# Patient Record
Sex: FEMALE | Race: WHITE | NOT HISPANIC OR LATINO | Employment: OTHER | URBAN - NONMETROPOLITAN AREA
[De-identification: names, ages, dates, MRNs, and addresses within clinical notes are randomized per-mention and may not be internally consistent; named-entity substitution may affect disease eponyms.]

---

## 2024-08-26 ENCOUNTER — OFFICE VISIT (OUTPATIENT)
Dept: FAMILY MEDICINE | Facility: OTHER | Age: 69
End: 2024-08-26
Payer: COMMERCIAL

## 2024-08-26 VITALS
HEIGHT: 64 IN | HEART RATE: 80 BPM | TEMPERATURE: 99.3 F | RESPIRATION RATE: 16 BRPM | OXYGEN SATURATION: 95 % | SYSTOLIC BLOOD PRESSURE: 136 MMHG | WEIGHT: 259.7 LBS | BODY MASS INDEX: 44.34 KG/M2 | DIASTOLIC BLOOD PRESSURE: 86 MMHG

## 2024-08-26 DIAGNOSIS — B37.2 CANDIDAL INTERTRIGO: ICD-10-CM

## 2024-08-26 DIAGNOSIS — L03.311 CELLULITIS OF ABDOMINAL WALL: Primary | ICD-10-CM

## 2024-08-26 PROCEDURE — G0463 HOSPITAL OUTPT CLINIC VISIT: HCPCS

## 2024-08-26 PROCEDURE — 250N000009 HC RX 250: Performed by: PHYSICIAN ASSISTANT

## 2024-08-26 PROCEDURE — 250N000011 HC RX IP 250 OP 636: Mod: JZ | Performed by: PHYSICIAN ASSISTANT

## 2024-08-26 PROCEDURE — G0463 HOSPITAL OUTPT CLINIC VISIT: HCPCS | Mod: 25

## 2024-08-26 PROCEDURE — 96372 THER/PROPH/DIAG INJ SC/IM: CPT | Performed by: PHYSICIAN ASSISTANT

## 2024-08-26 PROCEDURE — 99203 OFFICE O/P NEW LOW 30 MIN: CPT | Performed by: PHYSICIAN ASSISTANT

## 2024-08-26 RX ORDER — CEPHALEXIN 500 MG/1
500 CAPSULE ORAL 3 TIMES DAILY
Qty: 30 CAPSULE | Refills: 0 | Status: SHIPPED | OUTPATIENT
Start: 2024-08-26 | End: 2024-09-05

## 2024-08-26 RX ORDER — CEFTRIAXONE SODIUM 1 G
1 VIAL (EA) INJECTION ONCE
Status: COMPLETED | OUTPATIENT
Start: 2024-08-26 | End: 2024-08-26

## 2024-08-26 RX ORDER — LIDOCAINE HYDROCHLORIDE 10 MG/ML
2 INJECTION, SOLUTION EPIDURAL; INFILTRATION; INTRACAUDAL; PERINEURAL ONCE
Status: COMPLETED | OUTPATIENT
Start: 2024-08-26 | End: 2024-08-26

## 2024-08-26 RX ORDER — FLUCONAZOLE 150 MG/1
150 TABLET ORAL WEEKLY
Qty: 4 TABLET | Refills: 0 | Status: SHIPPED | OUTPATIENT
Start: 2024-08-26 | End: 2024-09-17

## 2024-08-26 RX ADMIN — CEFTRIAXONE SODIUM 1 G: 1 INJECTION, POWDER, FOR SOLUTION INTRAMUSCULAR; INTRAVENOUS at 19:27

## 2024-08-26 RX ADMIN — LIDOCAINE HYDROCHLORIDE 2 ML: 10 INJECTION, SOLUTION EPIDURAL; INFILTRATION; INTRACAUDAL; PERINEURAL at 19:37

## 2024-08-26 ASSESSMENT — PAIN SCALES - GENERAL: PAINLEVEL: EXTREME PAIN (8)

## 2024-08-26 NOTE — NURSING NOTE
"Chief Complaint   Patient presents with    Pain     Left inner thigh and groin    Patient presents to the clinic with inner thigh and groin pain.  She states that it started 8-23-24 with just swelling and has increased in intensity.  It is not as painful when she sits a certain way, otherwise it is very painful.    Initial /86 (BP Location: Right arm, Patient Position: Sitting, Cuff Size: Adult Large)   Pulse 80   Temp 99.3  F (37.4  C) (Tympanic)   Resp 16   Ht 1.626 m (5' 4\")   Wt 117.8 kg (259 lb 11.2 oz)   SpO2 95%   Breastfeeding No   BMI 44.58 kg/m   Estimated body mass index is 44.58 kg/m  as calculated from the following:    Height as of this encounter: 1.626 m (5' 4\").    Weight as of this encounter: 117.8 kg (259 lb 11.2 oz).  Medication Review: complete    The next two questions are to help us understand your food security.  If you are feeling you need any assistance in this area, we have resources available to support you today.           No data to display                  Health Care Directive:  Patient does not have a Health Care Directive or Living Will: Discussed advance care planning with patient; however, patient declined at this time.    Dorothy Gan      "

## 2024-08-27 NOTE — PROGRESS NOTES
ASSESSMENT/PLAN:     I have reviewed the nursing notes.  I have reviewed the findings, diagnosis, plan and need for follow up with the patient.    Meagan presents to clinic today for evaluation of left sided abdominal pain and swelling and redness with onset 4 days ago.  Is visiting the area from [place] spending time with her children.  Afebrile.  Vital signs stable.  In an abdominal pannus and in left groin is erythematous and tender to palpation.  Will treat for cellulitis and also cover for a Candida intertrigo.  Pharmacy is closed tonight so ceftriaxone 1 g given in clinic tonight.  Return to clinic for recheck in 2 to 3 days, sooner for new or worsening symptoms.     1. Cellulitis of abdominal wall  - cephALEXin (KEFLEX) 500 MG capsule; Take 1 capsule (500 mg) by mouth 3 times daily for 10 days.  Dispense: 30 capsule; Refill: 0  - cefTRIAXone (ROCEPHIN) in NS for IM administration 1 g  - lidocaine (PF) (XYLOCAINE) 1 % injection 2 mL    2. Candidal intertrigo  - fluconazole (DIFLUCAN) 150 MG tablet; Take 1 tablet (150 mg) by mouth once a week for 4 doses.  Dispense: 4 tablet; Refill: 0     Symptomatic treatments   Complete medications as prescribed  Return to clinic if symptoms persist/worsen        I explained my diagnostic considerations and recommendations to the patient, who voiced understanding and agreement with the treatment plan. All questions were answered. We discussed potential side effects of any prescribed or recommended therapies, as well as expectations for response to treatments.    Luz Maria Fajardo PA-C  [facility]          Nursing Notes:   Dorothy Gan  8/26/2024  7:06 PM  Signed  Chief Complaint   Patient presents with    Pain     Left inner thigh and groin    Patient presents to the clinic with inner thigh and groin pain.  She states that it started 8-23-24 with just swelling and has increased in intensity.  It is not as painful when she sits a certain way, otherwise it  "is very painful.    Initial /86 (BP Location: Right arm, Patient Position: Sitting, Cuff Size: Adult Large)   Pulse 80   Temp 99.3  F (37.4  C) (Tympanic)   Resp 16   Ht 1.626 m (5' 4\")   Wt 117.8 kg (259 lb 11.2 oz)   SpO2 95%   Breastfeeding No   BMI 44.58 kg/m   Estimated body mass index is 44.58 kg/m  as calculated from the following:    Height as of this encounter: 1.626 m (5' 4\").    Weight as of this encounter: 117.8 kg (259 lb 11.2 oz).  Medication Review: complete    The next two questions are to help us understand your food security.  If you are feeling you need any assistance in this area, we have resources available to support you today.           No data to display                  Health Care Directive:  Patient does not have a Health Care Directive or Living Will: Discussed advance care planning with patient; however, patient declined at this time.    Dorothy Gan           SUBJECTIVE:   Meagan Kennedy is a 68 year old female who presents to clinic today for evaluation of left lower abdominal wall pain and redness with swelling.  Onset: 4 days ago  Course worsening  Treatments: Nothing tried    Denies:  fever, body aches  Is otherwise feeling well            History reviewed. No pertinent past medical history.  History reviewed. No pertinent surgical history.  Social History     Tobacco Use    Smoking status: Former     Types: Cigarettes     Start date: 8/1/2024    Smokeless tobacco: Not on file   Substance Use Topics    Alcohol use: Yes     Alcohol/week: 1.0 standard drink of alcohol     Types: 1 Cans of beer per week     Comment: couple/year     No current outpatient medications on file.     Not on File      Past medical history, past surgical history, current medications and allergies reviewed and accurate to the best of my knowledge.          OBJECTIVE:     /86 (BP Location: Right arm, Patient Position: Sitting, Cuff Size: Adult Large)   Pulse 80   Temp 99.3  F (37.4  C) " "(Tympanic)   Resp 16   Ht 1.626 m (5' 4\")   Wt 117.8 kg (259 lb 11.2 oz)   SpO2 95%   Breastfeeding No   BMI 44.58 kg/m    Body mass index is 44.58 kg/m .  Physical Exam    General Appearance: Well appearing female, MIld acute distress with ambulating  Eyes: no injection, no tearing or drainage, eye lids normal  Respiratory: normal respiration, no increased work of breathing  Abdomen: erythema, mild warmth,  TTP in abdominal panus and groin area on left.       "

## 2024-08-27 NOTE — PATIENT INSTRUCTIONS
Cellulitis - skin infection to abdominal wall, left side  Ceftriaxone 1 gm in clinic tonight  Start cephalexin 500 mg oral tablet, take 1 tablet, 3 x daily for 7-10 days  For suspected yeast - diflucan 150 mg tablet, take 1 tablet weekly for up to 4 weeks as needed  Use cotton cloth to separate skin folds for several hours daily      Return to clinic for recheck in 2-3 days, sooner for new/worsening symptoms  Tylenol 1000 mg every 4-6 hours as needed for pain, 3000 mg max per day

## (undated) RX ORDER — CEFTRIAXONE SODIUM 1 G
VIAL (EA) INJECTION
Status: DISPENSED
Start: 2024-08-26

## (undated) RX ORDER — LIDOCAINE HYDROCHLORIDE 10 MG/ML
INJECTION, SOLUTION EPIDURAL; INFILTRATION; INTRACAUDAL; PERINEURAL
Status: DISPENSED
Start: 2024-08-26